# Patient Record
(demographics unavailable — no encounter records)

---

## 2025-04-02 NOTE — REVIEW OF SYSTEMS
[Seasonal Allergies] : seasonal allergies [Anxiety] : anxiety [Negative] : Heme/Lymph [FreeTextEntry1] : feel warmer than other

## 2025-04-02 NOTE — ADDENDUM
[FreeTextEntry1] :  Documented by Jarrod Zhou acting as scribe for Dr. Burrows on 04/02/2025. All Medical record entries made by the Scribe were at my, Dr. Burrows, direction and personally dictated by me on 04/02/2025 . I have reviewed the chart and agree that the record accurately reflects my personal performance of the history, physical exam, assessment and plan. I have also personally directed, reviewed, and agreed with the discharge instructions.

## 2025-04-02 NOTE — ASSESSMENT
[FreeTextEntry1] : Reviewed and reconciled medications, allergies, PMHx, PSHx, SocHx, FMHx.  Patient presents today stating that she was referred here by Dr. Dumont because she has a growth in her mouth for over a month. She states that she stopped smoking 19 years ago. She denies drinking. She denies using chewing tobacco. She denies pain or discomfort from the growth in her mouth. She denies that the growth in her mouth bleeds. She states that she asks people to repeat themselves occasionally. She denies tinnitus. She denies taking Aspirin or blood thinners. She denies ever having a COVID vaccine. She denies tinnitus. She states that she snores. She states that she wakes up with a dry mouth.  Physical exam: -right ear canal: cerumen removed via curettage -tuning forks lateralizes to the left ear -deviated septum bilaterally along the floor without obstruction -turbinate hypertrophy -small exophytic lesion on the left cheek - blanches -dry intraorally -fibroma on the right lateral border, left superior lateral border, and superior of the tongue -geographic tongue  Audio 4/2/25: -Type A Tymp AD -Type As Tymp AS -AD: Hearing WNL sloping to a moderate SNHL 250-8000 Hz -AS: Hearing WNL sloping to a moderate to moderately-severe essentially SNHL 250-8000 Hz (slight mixed noted at 4000 Hz) -92% discrimination AD and 84% discrimination AS at 60 dB -right TPP: -15 -left TPP: -7  Plan: Audio - results interpreted by Dr. Burrows and reviewed with the patient. -Start Mupirocin - apply to left cheek lesion TID -Use Breathe Right strips -Ordered ABR -Discussed possible MRI pending results from ABR -Discussed r/b/a of in-office excision of left cheek lesion pending response to Mupirocin -FU in 2-3 weeks with results from ABR

## 2025-04-02 NOTE — CONSULT LETTER
[Dear  ___] : Dear  [unfilled], [Consult Letter:] : I had the pleasure of evaluating your patient, [unfilled]. [Please see my note below.] : Please see my note below. [Consult Closing:] : Thank you very much for allowing me to participate in the care of this patient.  If you have any questions, please do not hesitate to contact me. [Sincerely,] : Sincerely, [FreeTextEntry3] :  Kong Burrows MD FACS

## 2025-04-02 NOTE — PHYSICAL EXAM
[Garcia Test Lateralizes To Left] : tone lateralization to the left [] : septum deviated bilaterally [Midline] : trachea located in midline position [Normal] : no rashes [Hearing Loss Right Only] : normal [Hearing Loss Left Only] : normal [FreeTextEntry8] : cerumen removed via curettage [de-identified] : turbinate hypertrophy [de-identified] : fibroma on the right lateral border, left superior lateral border, and superior of the tongue. geographic tongue [de-identified] : small exophytic lesion on the left cheek - blanches. dry intraorally [FreeTextEntry2] : sinuses nontender to percussion. sensations intact

## 2025-04-02 NOTE — HISTORY OF PRESENT ILLNESS
[de-identified] : Patient presents today stating that she was referred here by Dr. Dumont because she has a growth in her mouth for over a month. She states that she stopped smoking 19 years ago. She denies drinking. She denies using chewing tobacco. She denies pain or discomfort from the growth in her mouth. She denies that the growth in her mouth bleeds. She states that she asks people to repeat themselves occasionally. She denies tinnitus. She denies taking Aspirin or blood thinners. She denies ever having a COVID vaccine. She denies tinnitus. She states that she snores. She states that she wakes up with a dry mouth.

## 2025-04-02 NOTE — DATA REVIEWED
[de-identified] : Audio 4/2/25: -Type A Tymp AD -Type As Tymp AS -AD: Hearing WNL sloping to a moderate SNHL 250-8000 Hz -AS: Hearing WNL sloping to a moderate to moderately-severe essentially SNHL 250-8000 Hz (slight mixed noted at 4000 Hz) -92% discrimination AD and 84% discrimination AS at 60 dB -right TPP: -15 -left TPP: -7

## 2025-04-30 NOTE — ADDENDUM
[FreeTextEntry1] :  Documented by Jarrod Zhou acting as scribe for Dr. Burrows on 04/30/2025. All Medical record entries made by the Scribe were at my, Dr. Burrows, direction and personally dictated by me on 04/30/2025 . I have reviewed the chart and agree that the record accurately reflects my personal performance of the history, physical exam, assessment and plan. I have also personally directed, reviewed, and agreed with the discharge instructions.

## 2025-04-30 NOTE — DATA REVIEWED
[de-identified] : ABR 4/23/25: -normal in both ears  Audio 4/2/25: -Type A Tymp AD -Type As Tymp AS -AD: Hearing WNL sloping to a moderate SNHL 250-8000 Hz -AS: Hearing WNL sloping to a moderate to moderately-severe essentially SNHL 250-8000 Hz (slight mixed noted at 4000 Hz) -92% discrimination AD and 84% discrimination AS at 60 dB -right TPP: -15 -left TPP: -7

## 2025-04-30 NOTE — HISTORY OF PRESENT ILLNESS
[de-identified] : Patient with h/o asymmetrical HL, chronic rhinitis, oral mucosa lesion, and fibroma of tongue presents today for an in-office procedure: Excision of Left Cheek Lesion with Biopsy. She states that she may be biting on her cheeks/tongue.

## 2025-04-30 NOTE — ASSESSMENT
[FreeTextEntry1] :  Reviewed and reconciled medications, allergies, PMHx, PSHx, SocHx, FMHx.  Patient with h/o asymmetrical HL, chronic rhinitis, oral mucosa lesion, and fibroma of tongue presents today for an in-office procedure: Excision of Left Cheek Lesion with Biopsy. She states that she may be biting on her cheeks/tongue.  ABR 4/23/25: -normal in both ears  Audio 4/2/25: -Type A Tymp AD -Type As Tymp AS -AD: Hearing WNL sloping to a moderate SNHL 250-8000 Hz -AS: Hearing WNL sloping to a moderate to moderately-severe essentially SNHL 250-8000 Hz (slight mixed noted at 4000 Hz) -92% discrimination AD and 84% discrimination AS at 60 dB -right TPP: -15 -left TPP: -7  Physical exam: -small exophytic lesion on the left cheek -multiple fibromas on the tongue  ENT Procedure: Excision of Left Cheek Lesion with Biopsy  Plan: biopsy taken - results pending. Audio - results interpreted by Dr. Burrows and reviewed with the patient. -Consider amplification -FU with results from biopsy

## 2025-04-30 NOTE — DATA REVIEWED
[de-identified] : ABR 4/23/25: -normal in both ears  Audio 4/2/25: -Type A Tymp AD -Type As Tymp AS -AD: Hearing WNL sloping to a moderate SNHL 250-8000 Hz -AS: Hearing WNL sloping to a moderate to moderately-severe essentially SNHL 250-8000 Hz (slight mixed noted at 4000 Hz) -92% discrimination AD and 84% discrimination AS at 60 dB -right TPP: -15 -left TPP: -7

## 2025-04-30 NOTE — PROCEDURE
[FreeTextEntry1] : Excision of Left Cheek Lesion with Biopsy [FreeTextEntry2] : Oral mucosal lesion [FreeTextEntry3] :  Procedure: Excision of Left Cheek Lesion with Biopsy Procedure was explained to the patient. The risks, benefits, and alternatives were discussed with the patient. Patient gave consent to continue. Hurricaine topical anesthetic was sprayed onto the left cheek intraorally. Then 2 cc of 1% xyclocaine 1:[id number] epi was injected into the left cheek. Surgical scissors were used to remove lesion from the left cheek. Bleeding from the left cheek was controlled with silver nitrate cauterization. Patient tolerated the procedure well. Pathology sent to lab.

## 2025-04-30 NOTE — PROCEDURE
[FreeTextEntry1] : Excision of Left Cheek Lesion with Biopsy [FreeTextEntry2] : Oral mucosal lesion [FreeTextEntry3] :  Procedure: Excision of Left Cheek Lesion with Biopsy Procedure was explained to the patient. The risks, benefits, and alternatives were discussed with the patient. Patient gave consent to continue. Hurricaine topical anesthetic was sprayed onto the left cheek intraorally. Then 2 cc of 1% xyclocaine 1:[id number] epi was injected into the left cheek. Surgical scissors were used to remove lesion from the left cheek. Bleeding from the left cheek was controlled with silver nitrate cauterization. Patient tolerated the procedure well. Pathology sent to lab.

## 2025-04-30 NOTE — CONSULT LETTER
[Dear  ___] : Dear  [unfilled], [Courtesy Letter:] : I had the pleasure of seeing your patient, [unfilled], in my office today. [Please see my note below.] : Please see my note below. [Consult Closing:] : Thank you very much for allowing me to participate in the care of this patient.  If you have any questions, please do not hesitate to contact me. [Sincerely,] : Sincerely, [FreeTextEntry3] :  Kong Burrows MD FACS

## 2025-04-30 NOTE — PHYSICAL EXAM
[Normal] : mucosa is normal [Midline] : trachea located in midline position [de-identified] : multiple fibromas on the tongue [de-identified] : small exophytic lesion on the left cheek

## 2025-04-30 NOTE — HISTORY OF PRESENT ILLNESS
[de-identified] : Patient with h/o asymmetrical HL, chronic rhinitis, oral mucosa lesion, and fibroma of tongue presents today for an in-office procedure: Excision of Left Cheek Lesion with Biopsy. She states that she may be biting on her cheeks/tongue.

## 2025-04-30 NOTE — PHYSICAL EXAM
[Normal] : mucosa is normal [Midline] : trachea located in midline position [de-identified] : multiple fibromas on the tongue [de-identified] : small exophytic lesion on the left cheek